# Patient Record
Sex: MALE | Race: WHITE | Employment: FULL TIME | URBAN - METROPOLITAN AREA
[De-identification: names, ages, dates, MRNs, and addresses within clinical notes are randomized per-mention and may not be internally consistent; named-entity substitution may affect disease eponyms.]

---

## 2023-02-08 ENCOUNTER — HOSPITAL ENCOUNTER (EMERGENCY)
Age: 33
Discharge: HOME | End: 2023-02-08
Attending: EMERGENCY MEDICINE
Payer: COMMERCIAL

## 2023-02-08 VITALS
WEIGHT: 250 LBS | HEART RATE: 82 BPM | DIASTOLIC BLOOD PRESSURE: 94 MMHG | OXYGEN SATURATION: 100 % | BODY MASS INDEX: 37.03 KG/M2 | HEIGHT: 69 IN | TEMPERATURE: 98.3 F | RESPIRATION RATE: 18 BRPM | SYSTOLIC BLOOD PRESSURE: 161 MMHG

## 2023-02-08 DIAGNOSIS — H60.392 INFECTIVE OTITIS EXTERNA OF LEFT EAR: ICD-10-CM

## 2023-02-08 DIAGNOSIS — H61.22 IMPACTED CERUMEN OF LEFT EAR: Primary | ICD-10-CM

## 2023-02-08 PROCEDURE — 69209 REMOVE IMPACTED EAR WAX UNI: CPT

## 2023-02-08 PROCEDURE — 99283 EMERGENCY DEPT VISIT LOW MDM: CPT

## 2023-02-08 RX ORDER — CIPROFLOXACIN AND DEXAMETHASONE 3; 1 MG/ML; MG/ML
4 SUSPENSION/ DROPS AURICULAR (OTIC) 2 TIMES DAILY
Qty: 1 EACH | Refills: 0 | Status: SHIPPED | OUTPATIENT
Start: 2023-02-08 | End: 2023-02-15

## 2023-02-08 ASSESSMENT — PAIN DESCRIPTION - FREQUENCY: FREQUENCY: INTERMITTENT

## 2023-02-08 ASSESSMENT — PAIN DESCRIPTION - PAIN TYPE: TYPE: ACUTE PAIN

## 2023-02-08 ASSESSMENT — PAIN SCALES - GENERAL: PAINLEVEL_OUTOF10: 7

## 2023-02-08 ASSESSMENT — PAIN DESCRIPTION - LOCATION: LOCATION: EAR;JAW

## 2023-02-08 ASSESSMENT — PAIN DESCRIPTION - ORIENTATION: ORIENTATION: LEFT

## 2023-02-08 ASSESSMENT — PAIN DESCRIPTION - DESCRIPTORS: DESCRIPTORS: THROBBING

## 2023-02-08 ASSESSMENT — PAIN - FUNCTIONAL ASSESSMENT: PAIN_FUNCTIONAL_ASSESSMENT: 0-10

## 2023-02-08 NOTE — ED PROVIDER NOTES
[address] [facility]  EMERGENCY DEPARTMENT ENCOUNTER      Pt Name: Dolores Rayo  MRN: [id number]  [place] 1990  Date of evaluation: 2/8/2023  Provider: Paul Mejia MD    [address]       Chief Complaint   Patient presents with    Jaw Pain    Ear Problem     Pt states has had left ear pain last week increased this AM with Jaw pain. Pt rates pain 7/10. HISTORY OF PRESENT ILLNESS   (Location/Symptom, Timing/Onset, Context/Setting, Quality, Duration, Modifying Factors, Severity)  Note limiting factors. Dolores Rayo is a 27 y.o. male with past medical history of Crohn's disease here today with left ear pain. Patient states that for the last few days in particular tonight he has had an aching throbbing sometimes lancing pain in his left ear radiating into the jaw. No discharge or drainage from his ear but he has had diminished hearing. No fevers or chills. No obvious alleviating or aggravating factors. Took Motrin just prior to arrival.  No trauma or injury. No discrete dental pain or facial swelling. [facility]    Nursing Notes were reviewed. REVIEW OF SYSTEMS    (2-9 systems for level 4, 10 or more for level 5)     Review of Systems    Please see HPI for pertinent positive and negative review of system findings. A full 10 system ROS was performed and otherwise negative.         PAST MEDICAL HISTORY     Past Medical History:   Diagnosis Date    Crohn's disease ([address]          SURGICAL HISTORY       Past Surgical History:   Procedure Laterality Date    COLECTOMY           CURRENT MEDICATIONS       Previous Medications    LOPERAMIDE (IMODIUM) 2 MG CAPSULE    Take 2 mg by mouth 4 times daily as needed for Diarrhea    MESALAMINE (DELZICOL) 400 MG CPDR DELAYED RELEASE CAPSULE    Take 800 mg by mouth 3 times daily    METRONIDAZOLE (FLAGYL) 500 MG TABLET    Take 500 mg by mouth 3 times daily    SACCHAROMYCES BOULARDII 250 MG PACK    Take 250 mg by mouth 2 times daily    WARFARIN (COUMADIN) 5 MG TABLET    Take 2 tablets daily or as directed by coumadin clinic       ALLERGIES     Patient has no known allergies. FAMILY HISTORY     History reviewed. No pertinent family history. SOCIAL HISTORY       Social History     Socioeconomic History    Marital status: Single     Spouse name: None    Number of children: None    Years of education: None    Highest education level: None   Tobacco Use    Smoking status: Never   Substance and Sexual Activity    Alcohol use: No     Comment: very rare    Drug use: No       SCREENINGS    [place] Coma Scale  Eye Opening: Spontaneous  Best Verbal Response: Oriented  Best Motor Response: Obeys commands  [place] Coma Scale Score: 15          PHYSICAL EXAM    (up to 7 for level 4, 8 or more for level 5)     ED Triage Vitals [02/08/23 0607]   BP Temp Temp Source Heart Rate Resp SpO2 Height Weight   (!) 167/106 98.3 °F (36.8 °C) Oral 78 18 100 % 5' 9\" (1.753 m) 250 lb (113.4 kg)       Physical Exam      General appearance:  Cooperative. No acute distress. Skin:  Warm. Dry. Ears, nose, mouth and throat: Oropharynx pink and moist with no exudates. Tongue and uvula midline. Dentition normal with no surrounding erythema. No tenderness in the teeth. Right TM intact with some cerumen but no erythema, discharge or drainage in the right external auditory canal.  Left external auditory canal shows significant amount of cerumen, erythema, edema to the external auditory canal.  Once the cerumen was irrigated away I was able to view the left TM which is edematous, erythematous and approximately 50% obscured due to edema of the external auditory canal.  No obvious visible TM perforation. Perfusion:  intact  Respiratory: Respirations nonlabored. Neurological:  Alert.   Moves all extremities spontaneously  Musculoskeletal:   Normal ROM, no deformities          Psychiatric:  Normal mood      DIAGNOSTIC RESULTS       Labs Reviewed - No data to display    Interpretation per the Radiologist below, if obtained/available at the time of this note:    No orders to display       All other labs/imaging were within normal range or not returned as of this dictation. EMERGENCY DEPARTMENT COURSE and DIFFERENTIAL DIAGNOSIS/MDM:   Vitals:    Vitals:    02/08/23 0607   BP: (!) 167/106   Pulse: 78   Resp: 18   Temp: 98.3 °F (36.8 °C)   TempSrc: Oral   SpO2: 100%   Weight: 250 lb (113.4 kg)   Height: 5' 9\" (1.753 m)         Differential Diagnosis: Cerumen impaction, otitis externa, otitis media, mastoiditis    Patient presents today with left ear pain. Exam consistent with cerumen impaction and otitis externa. Cerumen irrigated away here patient tolerated. Moderate otitis externa but the majority the external auditory canal is completely patent but I can only see approximately 50% of the TM. No obvious perforation. Will prescribe Ciprodex. Otherwise safe for discharge home. Will be given ENT follow-up as needed    Medications and Route:   Medications - No data to display    History From: Patient         Chronic Conditions: Noted in HPI    CONSULTS: (Who and What was discussed)  None            Disposition Considerations (Tests not ordered but considered, Shared Decision Making, Pt Expectation of Test or Tx.):     Katrina Nascimento M.D., am the primary clinician of record. MDM      CONSULTS     None    Critical Care:   None    REASSESSMENT          PROCEDURE     Unless otherwise noted below, none     Ear Wax Removal    Date/Time: 2/8/2023 6:53 AM  Performed by: Jodi Beard MD  Authorized by: Jodi Beard MD     Consent:     Consent obtained:  Verbal    Consent given by:  Patient    Risks discussed:  Bleeding, infection, pain, TM perforation, incomplete removal and dizziness  Procedure details:     Location:  L ear    Procedure type: irrigation      Procedure outcomes: cerumen removed    Post-procedure details:      Inspection:  Macerated skin and TM intact    Hearing quality: Improved    Procedure completion:  Tolerated      FINAL IMPRESSION      1. Impacted cerumen of left ear    2. Infective otitis externa of left ear            DISPOSITION/PLAN   DISPOSITION Decision To Discharge 02/08/2023 06:49:15 AM        PATIENT REFERRED TO:  Jaden Jimenez MD  [address] [facility] [address] [place] [address]  [phone]    Schedule an appointment as soon as possible for a visit   Call for outpatient ENT follow-up as needed      DISCHARGE MEDICATIONS:  New Prescriptions    CIPROFLOXACIN-DEXAMETHASONE (CIPRODEX) 0.3-0.1 % OTIC SUSPENSION    Place 4 drops into the left ear 2 times daily for 7 days     Controlled Substances Monitoring:     No flowsheet data found.     (Please note that portions of this note were completed with a voice recognition program.  Efforts were made to edit the dictations but occasionally words are mis-transcribed.)    Matt Gaffney MD (electronically signed)  Attending Emergency Physician            Jodi Beard MD  02/08/23 1080